# Patient Record
Sex: FEMALE | Race: OTHER | NOT HISPANIC OR LATINO
[De-identification: names, ages, dates, MRNs, and addresses within clinical notes are randomized per-mention and may not be internally consistent; named-entity substitution may affect disease eponyms.]

---

## 2017-01-04 PROBLEM — Z00.00 ENCOUNTER FOR PREVENTIVE HEALTH EXAMINATION: Status: ACTIVE | Noted: 2017-01-04

## 2017-01-09 ENCOUNTER — APPOINTMENT (OUTPATIENT)
Dept: ORTHOPEDIC SURGERY | Facility: CLINIC | Age: 52
End: 2017-01-09

## 2017-01-09 VITALS
HEART RATE: 90 BPM | BODY MASS INDEX: 30.46 KG/M2 | DIASTOLIC BLOOD PRESSURE: 83 MMHG | HEIGHT: 68 IN | WEIGHT: 201 LBS | SYSTOLIC BLOOD PRESSURE: 128 MMHG

## 2017-01-09 DIAGNOSIS — M77.12 LATERAL EPICONDYLITIS, LEFT ELBOW: ICD-10-CM

## 2017-04-12 ENCOUNTER — EMERGENCY (EMERGENCY)
Facility: HOSPITAL | Age: 52
LOS: 1 days | Discharge: HOME | End: 2017-04-12
Attending: EMERGENCY MEDICINE | Admitting: EMERGENCY MEDICINE
Payer: MEDICAID

## 2017-04-12 VITALS
DIASTOLIC BLOOD PRESSURE: 81 MMHG | HEART RATE: 87 BPM | WEIGHT: 203.05 LBS | TEMPERATURE: 98 F | OXYGEN SATURATION: 99 % | HEIGHT: 69 IN | SYSTOLIC BLOOD PRESSURE: 125 MMHG | RESPIRATION RATE: 14 BRPM
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
## 2017-04-12 DIAGNOSIS — R55 SYNCOPE AND COLLAPSE: ICD-10-CM

## 2017-04-12 DIAGNOSIS — M54.5 LOW BACK PAIN: ICD-10-CM

## 2017-04-12 DIAGNOSIS — S32.049A UNSPECIFIED FRACTURE OF FOURTH LUMBAR VERTEBRA, INITIAL ENCOUNTER FOR CLOSED FRACTURE: ICD-10-CM
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
## 2017-04-12 LAB
ALBUMIN SERPL ELPH-MCNC: 3.9 G/DL — SIGNIFICANT CHANGE UP (ref 3.3–5)
ALP SERPL-CCNC: 82 U/L — SIGNIFICANT CHANGE UP (ref 40–120)
ALT FLD-CCNC: 36 U/L — SIGNIFICANT CHANGE UP (ref 12–78)
ANION GAP SERPL CALC-SCNC: 8 MMOL/L — SIGNIFICANT CHANGE UP (ref 5–17)
APPEARANCE UR: CLEAR — SIGNIFICANT CHANGE UP
APTT BLD: 29.5 SEC — SIGNIFICANT CHANGE UP (ref 27.5–37.4)
AST SERPL-CCNC: 23 U/L — SIGNIFICANT CHANGE UP (ref 15–37)
BASOPHILS # BLD AUTO: 0.1 K/UL — SIGNIFICANT CHANGE UP (ref 0–0.2)
BASOPHILS NFR BLD AUTO: 0.6 % — SIGNIFICANT CHANGE UP (ref 0–2)
BILIRUB SERPL-MCNC: 0.4 MG/DL — SIGNIFICANT CHANGE UP (ref 0.2–1.2)
BILIRUB UR-MCNC: NEGATIVE — SIGNIFICANT CHANGE UP
BUN SERPL-MCNC: 15 MG/DL — SIGNIFICANT CHANGE UP (ref 7–23)
CALCIUM SERPL-MCNC: 9.2 MG/DL — SIGNIFICANT CHANGE UP (ref 8.5–10.1)
CHLORIDE SERPL-SCNC: 104 MMOL/L — SIGNIFICANT CHANGE UP (ref 96–108)
CO2 SERPL-SCNC: 26 MMOL/L — SIGNIFICANT CHANGE UP (ref 22–31)
COLOR SPEC: YELLOW — SIGNIFICANT CHANGE UP
CREAT SERPL-MCNC: 1.3 MG/DL — SIGNIFICANT CHANGE UP (ref 0.5–1.3)
DIFF PNL FLD: NEGATIVE — SIGNIFICANT CHANGE UP
EOSINOPHIL # BLD AUTO: 0.1 K/UL — SIGNIFICANT CHANGE UP (ref 0–0.5)
EOSINOPHIL NFR BLD AUTO: 0.6 % — SIGNIFICANT CHANGE UP (ref 0–6)
GLUCOSE SERPL-MCNC: 98 MG/DL — SIGNIFICANT CHANGE UP (ref 70–99)
GLUCOSE UR QL: NEGATIVE — SIGNIFICANT CHANGE UP
HCT VFR BLD CALC: 44.2 % — SIGNIFICANT CHANGE UP (ref 39–50)
HGB BLD-MCNC: 13.8 G/DL — SIGNIFICANT CHANGE UP (ref 13–17)
INR BLD: 1.09 RATIO — SIGNIFICANT CHANGE UP (ref 0.88–1.16)
KETONES UR-MCNC: NEGATIVE — SIGNIFICANT CHANGE UP
LEUKOCYTE ESTERASE UR-ACNC: NEGATIVE — SIGNIFICANT CHANGE UP
LYMPHOCYTES # BLD AUTO: 13.3 % — SIGNIFICANT CHANGE UP (ref 13–44)
LYMPHOCYTES # BLD AUTO: 2.2 K/UL — SIGNIFICANT CHANGE UP (ref 1–3.3)
MCHC RBC-ENTMCNC: 21 PG — LOW (ref 27–34)
MCHC RBC-ENTMCNC: 31.4 GM/DL — LOW (ref 32–36)
MCV RBC AUTO: 66.9 FL — LOW (ref 80–100)
MONOCYTES # BLD AUTO: 1.2 K/UL — HIGH (ref 0–0.9)
MONOCYTES NFR BLD AUTO: 7.5 % — SIGNIFICANT CHANGE UP (ref 1–9)
NEUTROPHILS # BLD AUTO: 12.6 K/UL — HIGH (ref 1.8–7.4)
NEUTROPHILS NFR BLD AUTO: 78 % — HIGH (ref 43–77)
NITRITE UR-MCNC: NEGATIVE — SIGNIFICANT CHANGE UP
PH UR: 5 — SIGNIFICANT CHANGE UP (ref 4.8–8)
PLATELET # BLD AUTO: 242 K/UL — SIGNIFICANT CHANGE UP (ref 150–400)
POTASSIUM SERPL-MCNC: 4.1 MMOL/L — SIGNIFICANT CHANGE UP (ref 3.5–5.3)
POTASSIUM SERPL-SCNC: 4.1 MMOL/L — SIGNIFICANT CHANGE UP (ref 3.5–5.3)
PROT SERPL-MCNC: 7.4 G/DL — SIGNIFICANT CHANGE UP (ref 6–8.3)
PROT UR-MCNC: NEGATIVE — SIGNIFICANT CHANGE UP
PROTHROM AB SERPL-ACNC: 11.9 SEC — SIGNIFICANT CHANGE UP (ref 9.8–12.7)
RBC # BLD: 6.6 M/UL — HIGH (ref 4.2–5.8)
RBC # FLD: 15 % — HIGH (ref 10.3–14.5)
SODIUM SERPL-SCNC: 138 MMOL/L — SIGNIFICANT CHANGE UP (ref 135–145)
SP GR SPEC: 1.01 — SIGNIFICANT CHANGE UP (ref 1.01–1.02)
UROBILINOGEN FLD QL: NEGATIVE — SIGNIFICANT CHANGE UP
WBC # BLD: 16.2 K/UL — HIGH (ref 3.8–10.5)
WBC # FLD AUTO: 16.2 K/UL — HIGH (ref 3.8–10.5)

## 2017-04-12 PROCEDURE — 71010: CPT | Mod: 26

## 2017-04-12 PROCEDURE — 99285 EMERGENCY DEPT VISIT HI MDM: CPT

## 2017-04-12 PROCEDURE — 74177 CT ABD & PELVIS W/CONTRAST: CPT | Mod: 26

## 2017-04-12 PROCEDURE — 71260 CT THORAX DX C+: CPT | Mod: 26

## 2017-04-12 PROCEDURE — 70450 CT HEAD/BRAIN W/O DYE: CPT | Mod: 26

## 2017-04-12 PROCEDURE — 72125 CT NECK SPINE W/O DYE: CPT | Mod: 26

## 2017-04-12 PROCEDURE — 72100 X-RAY EXAM L-S SPINE 2/3 VWS: CPT | Mod: 26

## 2017-04-12 RX ORDER — MORPHINE SULFATE 50 MG/1
2 CAPSULE, EXTENDED RELEASE ORAL ONCE
Qty: 0 | Refills: 0 | Status: DISCONTINUED | OUTPATIENT
Start: 2017-04-12 | End: 2017-04-12

## 2017-04-12 RX ORDER — SODIUM CHLORIDE 9 MG/ML
1000 INJECTION INTRAMUSCULAR; INTRAVENOUS; SUBCUTANEOUS ONCE
Qty: 0 | Refills: 0 | Status: COMPLETED | OUTPATIENT
Start: 2017-04-12 | End: 2017-04-12

## 2017-04-12 RX ORDER — ONDANSETRON 8 MG/1
4 TABLET, FILM COATED ORAL ONCE
Qty: 0 | Refills: 0 | Status: COMPLETED | OUTPATIENT
Start: 2017-04-12 | End: 2017-04-12

## 2017-04-12 RX ORDER — LORATADINE 10 MG/1
0 TABLET ORAL
Qty: 0 | Refills: 0 | COMMUNITY

## 2017-04-12 RX ADMIN — MORPHINE SULFATE 2 MILLIGRAM(S): 50 CAPSULE, EXTENDED RELEASE ORAL at 21:35

## 2017-04-12 RX ADMIN — SODIUM CHLORIDE 1000 MILLILITER(S): 9 INJECTION INTRAMUSCULAR; INTRAVENOUS; SUBCUTANEOUS at 22:15

## 2017-04-12 RX ADMIN — ONDANSETRON 4 MILLIGRAM(S): 8 TABLET, FILM COATED ORAL at 21:05

## 2017-04-12 RX ADMIN — MORPHINE SULFATE 2 MILLIGRAM(S): 50 CAPSULE, EXTENDED RELEASE ORAL at 21:05

## 2017-04-12 NOTE — ED PROVIDER NOTE - ATTENDING CONTRIBUTION TO CARE
I have personally performed a face to face diagnostic evaluation on this patient.  I have reviewed the PA note and agree with the history, exam, and plan of care, except as noted.  History and Exam by me shows 52 male with fall from ladder while cutting branches, slipped and landed on back, had syncopal episode, awoke with back pain, crawled into house and asked wife to bring him to hospital, patient alert and oriented, no c-spine tenderness, moving all extremities, abdomen soft, has lumbar tenderness to palpation, based on mechanical mechanism patient to get ct head, cspine, chest abdomen and pelvis, f/u labs, pain control, re-eval.

## 2017-04-12 NOTE — ED ADULT NURSE NOTE - OBJECTIVE STATEMENT
Patient came in to ED c/o back pain s/p fall from a ladder 24 feet high today. Patient also states he had loss of consciousness.

## 2017-04-12 NOTE — ED ADULT TRIAGE NOTE - CHIEF COMPLAINT QUOTE
I fell from 24 feet on a ladder.  I was unconscious.  I landed on my buttocks on soil.  I do not remember if I hit my head.  c/o severe back pain

## 2017-04-12 NOTE — ED PROVIDER NOTE - OBJECTIVE STATEMENT
53 yo male presents with fall off 24 ft ladder today while cutting a tree branch, landed on ground had syncopal episode, has right back pain.  PMD Dr Lima

## 2017-04-12 NOTE — ED PROVIDER NOTE - PROGRESS NOTE DETAILS
seen and evaluated by orthopedic residents, will return in the morning for desposition regarding transverse process fracture of lumbar spine, state patient is allowed to eat Spoke with orthopedics stable for discharge home and follow up as outpatient.  Pt updated.  Pain controlled at this time will discharge home

## 2017-04-12 NOTE — ED PROVIDER NOTE - CARE PLAN
Principal Discharge DX:	Syncopal episodes  Secondary Diagnosis:	Fall from height of greater than 3 feet  Secondary Diagnosis:	Back pain

## 2017-04-13 VITALS
HEART RATE: 75 BPM | DIASTOLIC BLOOD PRESSURE: 63 MMHG | SYSTOLIC BLOOD PRESSURE: 108 MMHG | TEMPERATURE: 98 F | OXYGEN SATURATION: 98 % | RESPIRATION RATE: 16 BRPM

## 2017-04-13 LAB
CULTURE RESULTS: NO GROWTH — SIGNIFICANT CHANGE UP
SPECIMEN SOURCE: SIGNIFICANT CHANGE UP

## 2017-04-13 PROCEDURE — 71260 CT THORAX DX C+: CPT

## 2017-04-13 PROCEDURE — 85027 COMPLETE CBC AUTOMATED: CPT

## 2017-04-13 PROCEDURE — 81003 URINALYSIS AUTO W/O SCOPE: CPT

## 2017-04-13 PROCEDURE — 72040 X-RAY EXAM NECK SPINE 2-3 VW: CPT

## 2017-04-13 PROCEDURE — 72040 X-RAY EXAM NECK SPINE 2-3 VW: CPT | Mod: 26

## 2017-04-13 PROCEDURE — 72125 CT NECK SPINE W/O DYE: CPT

## 2017-04-13 PROCEDURE — 96374 THER/PROPH/DIAG INJ IV PUSH: CPT | Mod: XU

## 2017-04-13 PROCEDURE — 74177 CT ABD & PELVIS W/CONTRAST: CPT

## 2017-04-13 PROCEDURE — 96375 TX/PRO/DX INJ NEW DRUG ADDON: CPT

## 2017-04-13 PROCEDURE — 85730 THROMBOPLASTIN TIME PARTIAL: CPT

## 2017-04-13 PROCEDURE — 86900 BLOOD TYPING SEROLOGIC ABO: CPT

## 2017-04-13 PROCEDURE — 87086 URINE CULTURE/COLONY COUNT: CPT

## 2017-04-13 PROCEDURE — 93005 ELECTROCARDIOGRAM TRACING: CPT

## 2017-04-13 PROCEDURE — 70450 CT HEAD/BRAIN W/O DYE: CPT

## 2017-04-13 PROCEDURE — 80053 COMPREHEN METABOLIC PANEL: CPT

## 2017-04-13 PROCEDURE — 86850 RBC ANTIBODY SCREEN: CPT

## 2017-04-13 PROCEDURE — 71045 X-RAY EXAM CHEST 1 VIEW: CPT

## 2017-04-13 PROCEDURE — 72100 X-RAY EXAM L-S SPINE 2/3 VWS: CPT

## 2017-04-13 PROCEDURE — 85610 PROTHROMBIN TIME: CPT

## 2017-04-13 PROCEDURE — 86901 BLOOD TYPING SEROLOGIC RH(D): CPT

## 2017-04-13 PROCEDURE — 99284 EMERGENCY DEPT VISIT MOD MDM: CPT | Mod: 25

## 2017-04-13 PROCEDURE — 72170 X-RAY EXAM OF PELVIS: CPT | Mod: 26

## 2017-04-13 PROCEDURE — 72170 X-RAY EXAM OF PELVIS: CPT

## 2017-04-13 NOTE — ED ADULT NURSE REASSESSMENT NOTE - NS ED NURSE REASSESS COMMENT FT1
Patient transported to Cat scan per stretcher.
Pt resting on stretcher awaiting d/c, does not want to take pain medication at this time.

## 2017-04-13 NOTE — CONSULT NOTE ADULT - SUBJECTIVE AND OBJECTIVE BOX
Patient is a 52y Male who presents s/p fall from 24 ft ladder c/o low back pain. + HS/LOC. CT head/C spine in ED negative. Denies pain/injury elsewhere. Denies numbness/tingling/paresthesias/weakness. Denies bowel/bladder incontinence. Denies saddle anesthesia. Denies fevers/chills. No other complaints at this time.      MEDICATIONS  (STANDING):      Allergies    No Known Allergies    Intolerances        PAST MEDICAL & SURGICAL HISTORY:  Seasonal allergies  No significant past surgical history                            13.8   16.2  )-----------( 242      ( 2017 19:47 )             44.2       2017 19:47    138    |  104    |  15     ----------------------------<  98     4.1     |  26     |  1.30     Ca    9.2        2017 19:47    TPro  7.4    /  Alb  3.9    /  TBili  0.4    /  DBili  x      /  AST  23     /  ALT  36     /  AlkPhos  82     2017 19:47      PT/INR - ( 2017 19:47 )   PT: 11.9 sec;   INR: 1.09 ratio         PTT - ( 2017 19:47 )  PTT:29.5 sec    Urinalysis Basic - ( 2017 21:58 )    Color: Yellow / Appearance: Clear / S.015 / pH: x  Gluc: x / Ketone: Negative  / Bili: Negative / Urobili: Negative   Blood: x / Protein: Negative / Nitrite: Negative   Leuk Esterase: Negative / RBC: x / WBC x   Sq Epi: x / Non Sq Epi: x / Bacteria: x        Vital Signs Last 24 Hrs  T(C): 37.1, Max: 37.1 ( @ 21:04)  T(F): 98.7, Max: 98.7 ( @ 21:04)  HR: 74 (74 - 87)  BP: 135/83 (125/81 - 135/83)  BP(mean): --  RR: 14 (14 - 14)  SpO2: 99% (99% - 99%)    Gen: NAD    Spine PE:  Skin intact  No gross deformity  midnline TTP L spine  No bony step offs  Lumbar paraspinal muscle ttp/hypertonicity   Negative clonus  Negative babinski  Negative vaughn  No saddle anesthesia    Motor:                     C5                C6              C7               C8           T1   R            5/5---------------------------------------------------------------------->  L            5/5----------------------------------------------------------------------->                  L2             L3             L4               L5            S1  R         5/5---------------------------------------------------------------->  L          5/5---------------------------------------------------------------->    C5-T1, L2-S1 SILT B/L    Imaging: CT C sp: Neg; CT C/A/P: R L2-4 transverse process Fx, XR pel/C spine: Neg    A/P: 52y Male with right L2-L4 transverse process fractures  Pain control  WBAT with assistive devices as needed  FU Labs/imaging  Observation overnight  No acute orthopedic surgical intervention  Will discuss with attending   FU with Dr. Grigsby in office 5-7 days from [place]  Ortho stable

## 2017-04-13 NOTE — PROGRESS NOTE ADULT - SUBJECTIVE AND OBJECTIVE BOX
Patient seen and examined. Resting comfortably. Pain control adequate, No events overnight. No change in symptoms.    Vital Signs Last 24 Hrs  T(C): 36.4, Max: 37.1 (04-12 @ 21:04)  T(F): 97.5, Max: 98.7 (04-12 @ 21:04)  HR: 73 (70 - 87)  BP: 112/75 (112/75 - 135/83)  BP(mean): --  RR: 14 (14 - 14)  SpO2: 96% (96% - 99%)    Gen: NAD    Spine PE:  Skin intact  No gross deformity  midnline TTP L spine  Lumbar paraspinal muscle ttp/hypertonicity   Negative clonus  Negative babinski  Negative vaughn    Motor:                     C5                C6              C7               C8           T1   R            5/5---------------------------------------------------------------------->  L            5/5----------------------------------------------------------------------->                  L2             L3             L4               L5            S1  R         5/5---------------------------------------------------------------->  L          5/5---------------------------------------------------------------->    C5-T1, L2-S1 SILT B/L      A/P: 52y Male with right L2-L4 transverse process fractures  Pain control  WBAT with assistive devices as needed  FU Labs/imaging  No acute orthopedic surgical intervention  Will discuss with attending   FU with Dr. Grigsby in office 5-7 days from [place]  Further recs to come

## 2017-04-19 ENCOUNTER — APPOINTMENT (OUTPATIENT)
Dept: ORTHOPEDIC SURGERY | Facility: CLINIC | Age: 52
End: 2017-04-19

## 2017-04-19 DIAGNOSIS — M47.817 SPONDYLOSIS W/OUT MYELOPATHY OR RADICULOPATHY, LUMBOSACRAL REGION: ICD-10-CM

## 2017-04-19 DIAGNOSIS — M47.812 SPONDYLOSIS W/OUT MYELOPATHY OR RADICULOPATHY, CERVICAL REGION: ICD-10-CM

## 2017-04-19 DIAGNOSIS — Z80.9 FAMILY HISTORY OF MALIGNANT NEOPLASM, UNSPECIFIED: ICD-10-CM

## 2017-04-19 DIAGNOSIS — Z78.9 OTHER SPECIFIED HEALTH STATUS: ICD-10-CM

## 2017-04-19 DIAGNOSIS — Z82.61 FAMILY HISTORY OF ARTHRITIS: ICD-10-CM

## 2018-07-27 PROBLEM — M47.817 LUMBOSACRAL SPONDYLOSIS: Status: ACTIVE | Noted: 2017-04-19

## 2018-07-27 PROBLEM — M47.812 CERVICAL SPONDYLOSIS: Status: ACTIVE | Noted: 2017-04-19

## 2021-08-12 PROBLEM — J30.2 OTHER SEASONAL ALLERGIC RHINITIS: Chronic | Status: ACTIVE | Noted: 2017-04-12

## 2021-08-30 ENCOUNTER — APPOINTMENT (OUTPATIENT)
Dept: SPINE | Facility: CLINIC | Age: 56
End: 2021-08-30
Payer: MEDICAID

## 2021-08-30 VITALS
DIASTOLIC BLOOD PRESSURE: 94 MMHG | BODY MASS INDEX: 28.63 KG/M2 | WEIGHT: 200 LBS | HEART RATE: 88 BPM | SYSTOLIC BLOOD PRESSURE: 144 MMHG | HEIGHT: 70 IN

## 2021-08-30 DIAGNOSIS — M54.12 RADICULOPATHY, CERVICAL REGION: ICD-10-CM

## 2021-08-30 DIAGNOSIS — M50.90 CERVICAL DISC DISORDER, UNSPECIFIED, UNSPECIFIED CERVICAL REGION: ICD-10-CM

## 2021-08-30 DIAGNOSIS — M48.02 SPINAL STENOSIS, CERVICAL REGION: ICD-10-CM

## 2021-08-30 PROCEDURE — 99204 OFFICE O/P NEW MOD 45 MIN: CPT

## 2021-08-30 RX ORDER — MELOXICAM 15 MG/1
15 TABLET ORAL DAILY
Qty: 30 | Refills: 0 | Status: DISCONTINUED | COMMUNITY
Start: 2017-01-20 | End: 2021-08-30

## 2021-08-30 RX ORDER — NAPROXEN 500 MG/1
500 TABLET ORAL
Refills: 0 | Status: ACTIVE | COMMUNITY

## 2021-08-30 RX ORDER — PREGABALIN 150 MG/1
150 CAPSULE ORAL
Refills: 0 | Status: ACTIVE | COMMUNITY

## 2021-08-30 RX ORDER — ASPIRIN 81 MG
81 TABLET, DELAYED RELEASE (ENTERIC COATED) ORAL
Refills: 0 | Status: ACTIVE | COMMUNITY

## 2021-08-30 RX ORDER — CYCLOBENZAPRINE HYDROCHLORIDE 10 MG/1
10 TABLET, FILM COATED ORAL 3 TIMES DAILY
Qty: 60 | Refills: 0 | Status: DISCONTINUED | COMMUNITY
Start: 2017-04-19 | End: 2021-08-30

## 2021-08-30 RX ORDER — LORATADINE 10 MG/1
10 TABLET ORAL
Refills: 0 | Status: DISCONTINUED | COMMUNITY
End: 2021-08-30
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
## 2021-08-30 NOTE — REVIEW OF SYSTEMS
[As Noted in HPI] : as noted in HPI [Numbness] : numbness [Tingling] : tingling [Abnormal Sensation] : an abnormal sensation [Negative] : Heme/Lymph

## 2021-08-30 NOTE — PHYSICAL EXAM
[General Appearance - Alert] : alert [General Appearance - In No Acute Distress] : in no acute distress [Oriented To Time, Place, And Person] : oriented to person, place, and time [Impaired Insight] : insight and judgment were intact [Cranial Nerves Optic (II)] : visual acuity intact bilaterally,  pupils equal round and reactive to light [Cranial Nerves Oculomotor (III)] : extraocular motion intact [Cranial Nerves Trigeminal (V)] : facial sensation intact symmetrically [Cranial Nerves Facial (VII)] : face symmetrical [Cranial Nerves Glossopharyngeal (IX)] : tongue and palate midline [Cranial Nerves Vestibulocochlear (VIII)] : hearing was intact bilaterally [Cranial Nerves Accessory (XI - Cranial And Spinal)] : head turning and shoulder shrug symmetric [Cranial Nerves Hypoglossal (XII)] : there was no tongue deviation with protrusion [Sclera] : the sclera and conjunctiva were normal [] : no respiratory distress [Heart Rate And Rhythm] : heart rate was normal and rhythm regular [Abdomen Soft] : soft [Skin Color & Pigmentation] : normal skin color and pigmentation [Person] : oriented to person [Place] : oriented to place [Time] : oriented to time [Short Term Intact] : short term memory intact [Remote Intact] : remote memory intact [Span Intact] : the attention span was normal [Concentration Intact] : normal concentrating ability [Fluency] : fluency intact [Comprehension] : comprehension intact [Current Events] : adequate knowledge of current events [Past History] : adequate knowledge of personal past history [Vocabulary] : adequate range of vocabulary [Motor Tone] : muscle tone was normal in all four extremities [No Muscle Atrophy] : normal bulk in all four extremities [Sensation Tactile Decrease] : light touch was intact [Abnormal Walk] : normal gait [Balance] : balance was intact [Past-pointing] : there was no past-pointing [Tremor] : no tremor present [2+] : Ankle jerk left 2+ [Plantar Reflex Right Only] : normal on the right [Plantar Reflex Left Only] : normal on the left [Brunner] : Brunner's sign was not demonstrated [FreeTextEntry6] : bilateral  and proximal lower extremity weakness 4/5 [No Tenderness to Palpation] : no spine tenderness on palpation [L'Hermitte's] : neck flexion did not produce tingling down the spine/arms [Spurling's - Opposite Side] : Negative Spurling's on opposite side [Spurling's Same Side] : Negative Spurling's on same side [FreeTextEntry1] : Difficulty with toe walking

## 2021-08-30 NOTE — ASSESSMENT
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 This is producing  and proximal lower extremity weakness. I suggested he consider 4 level ACDF. Prior to surgery x-rays and a CT scan of the cervical spine would be needed. Complete explanation of all relative risks, benefits and alternatives has been given. He is aware that continued spinal cord impingement could produce irreversible neurological impairment.  I examined and evaluated this patient along with the nurse practitioner and we agreed on a plan of care.

## 2021-08-30 NOTE — END OF VISIT
[FreeTextEntry3] : I, Dr.Peter Pineda, evaluated the patient with the nurse practitioner Paul Rondon and established the plan of care. I personally discuss this patient with the nurse practitioner at the time of the visit. I agree with the assessment and plan as written, unless noted below.\par

## 2021-08-30 NOTE — CONSULT LETTER
[Consult Letter:] : I had the pleasure of evaluating your patient, [unfilled]. [Dear  ___] : Dear  [unfilled], [Please see my note below.] : Please see my note below. [Consult Closing:] : Thank you very much for allowing me to participate in the care of this patient.  If you have any questions, please do not hesitate to contact me. [Sincerely,] : Sincerely, [FreeTextEntry2] : Dr Patricia Heller [FreeTextEntry3] : Clarence Pineda MD\par Chief of Neurosurgery [facility]\par [facility]\par

## 2023-03-06 ENCOUNTER — EMERGENCY (EMERGENCY)
Facility: HOSPITAL | Age: 58
LOS: 1 days | Discharge: HOME | End: 2023-03-06
Admitting: EMERGENCY MEDICINE
Payer: MEDICAID

## 2023-03-06 VITALS
RESPIRATION RATE: 18 BRPM | HEART RATE: 82 BPM | DIASTOLIC BLOOD PRESSURE: 82 MMHG | SYSTOLIC BLOOD PRESSURE: 139 MMHG | TEMPERATURE: 98 F | OXYGEN SATURATION: 100 %

## 2023-03-06 PROCEDURE — 99284 EMERGENCY DEPT VISIT MOD MDM: CPT

## 2023-03-07 DIAGNOSIS — Z98.890 OTHER SPECIFIED POSTPROCEDURAL STATES: Chronic | ICD-10-CM

## 2023-03-07 LAB
ALBUMIN SERPL ELPH-MCNC: 4.3 G/DL — SIGNIFICANT CHANGE UP (ref 3.3–5)
ALP SERPL-CCNC: 82 U/L — SIGNIFICANT CHANGE UP (ref 40–120)
ALT FLD-CCNC: 24 U/L — SIGNIFICANT CHANGE UP (ref 4–41)
ANION GAP SERPL CALC-SCNC: 10 MMOL/L — SIGNIFICANT CHANGE UP (ref 7–14)
AST SERPL-CCNC: 18 U/L — SIGNIFICANT CHANGE UP (ref 4–40)
BASOPHILS # BLD AUTO: 0.07 K/UL — SIGNIFICANT CHANGE UP (ref 0–0.2)
BASOPHILS NFR BLD AUTO: 0.7 % — SIGNIFICANT CHANGE UP (ref 0–2)
BILIRUB SERPL-MCNC: 0.4 MG/DL — SIGNIFICANT CHANGE UP (ref 0.2–1.2)
BUN SERPL-MCNC: 18 MG/DL — SIGNIFICANT CHANGE UP (ref 7–23)
CALCIUM SERPL-MCNC: 9.5 MG/DL — SIGNIFICANT CHANGE UP (ref 8.4–10.5)
CHLORIDE SERPL-SCNC: 103 MMOL/L — SIGNIFICANT CHANGE UP (ref 98–107)
CO2 SERPL-SCNC: 23 MMOL/L — SIGNIFICANT CHANGE UP (ref 22–31)
CREAT SERPL-MCNC: 1.56 MG/DL — HIGH (ref 0.5–1.3)
EGFR: 51 ML/MIN/1.73M2 — LOW
EOSINOPHIL # BLD AUTO: 0.38 K/UL — SIGNIFICANT CHANGE UP (ref 0–0.5)
EOSINOPHIL NFR BLD AUTO: 4 % — SIGNIFICANT CHANGE UP (ref 0–6)
GLUCOSE SERPL-MCNC: 95 MG/DL — SIGNIFICANT CHANGE UP (ref 70–99)
HAV IGM SER-ACNC: SIGNIFICANT CHANGE UP
HBV CORE IGM SER-ACNC: SIGNIFICANT CHANGE UP
HBV SURFACE AB SER-ACNC: SIGNIFICANT CHANGE UP
HBV SURFACE AG SER-ACNC: SIGNIFICANT CHANGE UP
HBV SURFACE AG SER-ACNC: SIGNIFICANT CHANGE UP
HCT VFR BLD CALC: 42.3 % — SIGNIFICANT CHANGE UP (ref 39–50)
HCV AB S/CO SERPL IA: 0.24 S/CO — SIGNIFICANT CHANGE UP (ref 0–0.99)
HCV AB SERPL-IMP: SIGNIFICANT CHANGE UP
HCV RNA FLD QL NAA+PROBE: SIGNIFICANT CHANGE UP
HCV RNA SPEC QL PROBE+SIG AMP: SIGNIFICANT CHANGE UP
HGB BLD-MCNC: 12.6 G/DL — LOW (ref 13–17)
HIV 1+2 AB+HIV1 P24 AG SERPL QL IA: SIGNIFICANT CHANGE UP
HIV 1+2 AB+HIV1 P24 AG SERPL QL IA: SIGNIFICANT CHANGE UP
IANC: 4.55 K/UL — SIGNIFICANT CHANGE UP (ref 1.8–7.4)
IMM GRANULOCYTES NFR BLD AUTO: 0.2 % — SIGNIFICANT CHANGE UP (ref 0–0.9)
LYMPHOCYTES # BLD AUTO: 3.39 K/UL — HIGH (ref 1–3.3)
LYMPHOCYTES # BLD AUTO: 36 % — SIGNIFICANT CHANGE UP (ref 13–44)
MCHC RBC-ENTMCNC: 19.3 PG — LOW (ref 27–34)
MCHC RBC-ENTMCNC: 29.8 GM/DL — LOW (ref 32–36)
MCV RBC AUTO: 64.8 FL — LOW (ref 80–100)
MONOCYTES # BLD AUTO: 1.01 K/UL — HIGH (ref 0–0.9)
MONOCYTES NFR BLD AUTO: 10.7 % — SIGNIFICANT CHANGE UP (ref 2–14)
NEUTROPHILS # BLD AUTO: 4.55 K/UL — SIGNIFICANT CHANGE UP (ref 1.8–7.4)
NEUTROPHILS NFR BLD AUTO: 48.4 % — SIGNIFICANT CHANGE UP (ref 43–77)
NRBC # BLD: 0 /100 WBCS — SIGNIFICANT CHANGE UP (ref 0–0)
NRBC # FLD: 0 K/UL — SIGNIFICANT CHANGE UP (ref 0–0)
PLATELET # BLD AUTO: 226 K/UL — SIGNIFICANT CHANGE UP (ref 150–400)
POTASSIUM SERPL-MCNC: 4 MMOL/L — SIGNIFICANT CHANGE UP (ref 3.5–5.3)
POTASSIUM SERPL-SCNC: 4 MMOL/L — SIGNIFICANT CHANGE UP (ref 3.5–5.3)
PROT SERPL-MCNC: 6.9 G/DL — SIGNIFICANT CHANGE UP (ref 6–8.3)
RBC # BLD: 6.53 M/UL — HIGH (ref 4.2–5.8)
RBC # FLD: 19.1 % — HIGH (ref 10.3–14.5)
SODIUM SERPL-SCNC: 136 MMOL/L — SIGNIFICANT CHANGE UP (ref 135–145)
WBC # BLD: 9.42 K/UL — SIGNIFICANT CHANGE UP (ref 3.8–10.5)
WBC # FLD AUTO: 9.42 K/UL — SIGNIFICANT CHANGE UP (ref 3.8–10.5)

## 2023-03-07 RX ORDER — EMTRICITABINE AND TENOFOVIR DISOPROXIL FUMARATE 200; 300 MG/1; MG/1
1 TABLET, FILM COATED ORAL
Qty: 30 | Refills: 0
Start: 2023-03-07 | End: 2023-04-05

## 2023-03-07 RX ORDER — RALTEGRAVIR 400 MG/1
1 TABLET, FILM COATED ORAL
Qty: 60 | Refills: 0
Start: 2023-03-07 | End: 2023-04-05

## 2023-03-07 NOTE — ED PROVIDER NOTE - NSFOLLOWUPINSTRUCTIONS_ED_ALL_ED_FT
Follow up with your primary doctor. You have elected to begin antiviral (HIV) prophylaxis. You were given 7 days of the medication from the ER and the full course is 28 days. You were sent 30 days to the pharmacy because the pharmacy will not dispense less than a 30 day course but will only need to take 21 additional days. Attempt to have the source patient's blood tested if consent could be obtained. Advance activity as tolerated.  Continue all previously prescribed medications as directed.  Follow up with your primary care physician in 48-72 hours- bring copies of your results.  Return to the ER for worsening or persistent symptoms, and/or ANY NEW OR CONCERNING SYMPTOMS. THIS INCLUDES BUT IS NOT LIMITED TO INCREASING REDNESS OR SWELLING OF YOUR HAND, INCREASING PAIN OR FOR ANY OTHER SYMPTOMS THAT CONCERN YOU. If you have issues obtaining follow up, please call: [phone] to obtain a doctor or specialist who takes your insurance in your area.  You may call [phone] to make an appointment with the internal medicine clinic.

## 2023-03-07 NOTE — ED PROVIDER NOTE - CLINICAL SUMMARY MEDICAL DECISION MAKING FREE TEXT BOX
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 Pt stats he expressed blood from his wound and cleaned the area. Pt was offered and accepts HIV prophylaxis. Pt advised to attempt to have blood from the source patient if consent can be obtained.

## 2023-03-07 NOTE — ED ADULT NURSE NOTE - OBJECTIVE STATEMENT
pt rcd to intake chairs. pt a&ox4 c/o needle stick. pt works at dialysis center. pt butterfly to the [facility]. labs collected and sent. pt given PEP pack as per MD orders. pt respirations even and unlabored. pt no complaints. pt ambulatory for DC.

## 2023-03-07 NOTE — ED POST DISCHARGE NOTE - RESULT SUMMARY
ALEXA Muller: Pt called to f/u results, discussed HIV negative. Cr minimally elevated, advised PMD follow up

## 2023-03-07 NOTE — ED PROVIDER NOTE - PATIENT PORTAL LINK FT
You can access the FollowMyHealth Patient Portal offered by [facility] by registering at the following website: [email or web address]. By joining [employer]’s FollowMyHealth portal, you will also be able to view your health information using other applications (apps) compatible with our system.

## 2023-03-07 NOTE — ED PROVIDER NOTE - OBJECTIVE STATEMENT
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 Pt stats he expressed blood from his wound and cleaned the area. Pt states he told his supervisors but does not believe a source patient blood draw was performed. Pt states he had a TDAP, unknown year, declines booster and states he is vaccinated for Hep B most recently 2 years ago. Pt states he is interested in beginning HIV prophylaxis. Pt denies any other sx or acute complaints.

## 2023-04-07 ENCOUNTER — NON-APPOINTMENT (OUTPATIENT)
Age: 58
End: 2023-04-07

## 2025-07-01 NOTE — ED PROVIDER NOTE - MUSCULOSKELETAL NECK EXAM
Covering/collaborating with Dr. Alton MD.   Labs are overall ok. Glucose and creatinine are slightly elevated. Encourage good hydration and Continue present management   Nirmala Ospina PA-C 7/1/2025 7:38 AM  mild paraspinal cervical spine pain, no visible abnormalities